# Patient Record
Sex: MALE | Race: BLACK OR AFRICAN AMERICAN | URBAN - METROPOLITAN AREA
[De-identification: names, ages, dates, MRNs, and addresses within clinical notes are randomized per-mention and may not be internally consistent; named-entity substitution may affect disease eponyms.]

---

## 2023-03-22 ENCOUNTER — POST-OPERATIVE VISIT (OUTPATIENT)
Facility: LOCATION | Age: 28
End: 2023-03-22
Payer: COMMERCIAL

## 2023-03-22 DIAGNOSIS — Z48.810 ENCOUNTER FOR SURGICAL AFTERCARE FOLLOWING SURGERY ON A SENSE ORGAN: Primary | ICD-10-CM

## 2023-03-22 PROCEDURE — 99024 POSTOP FOLLOW-UP VISIT: CPT | Performed by: OPTOMETRIST

## 2023-03-22 NOTE — IMPRESSION/PLAN
Impression:  Encounter for surgical aftercare following surgery on a sense organ  Z48.810. 1 week s/p LASIK OU doing well Plan: d/c antibiotic Moxifloxacin and steroid Prednisolone 2-3 days. Continue non-preserved artificial tears q 2 hrs x 1 week. Then switch to bottled tears qid x 2 months then bid x 2 months. Patient to RTC 1 month. Check VA (OD,OS,OU), Ks, prism balance MR, and exam.
RTC PRN decrease VA, increase pain, redness, discharge, photophobia, flashes/floaters or other vision problems.

## 2023-03-28 ENCOUNTER — POST-OPERATIVE VISIT (OUTPATIENT)
Facility: LOCATION | Age: 28
End: 2023-03-28

## 2023-03-28 DIAGNOSIS — Z48.810 ENCOUNTER FOR SURGICAL AFTERCARE FOLLOWING SURGERY ON A SENSE ORGAN: Primary | ICD-10-CM

## 2023-03-28 PROCEDURE — 99024 POSTOP FOLLOW-UP VISIT: CPT | Performed by: OPTOMETRIST

## 2023-03-28 NOTE — IMPRESSION/PLAN
Impression: S/P ID LASIK OU OU - 12 Days. Encounter for surgical aftercare following surgery on a sense organ  Z48.810. 
1 week s/p LASIK OU doing well\
+ [facility] OD>OS Plan: Continue artificial tears qid x 2 months then bid x 2 months. Patient to RTC 1 month. Check VA (OD,OS,OU), Ks, prism balance MR, and exam.
RTC PRN decrease VA, increase pain, redness, discharge, photophobia, flashes/floaters or other vision problems.

## 2023-04-18 ENCOUNTER — POST-OPERATIVE VISIT (OUTPATIENT)
Facility: LOCATION | Age: 28
End: 2023-04-18

## 2023-04-18 DIAGNOSIS — Z48.810 ENCOUNTER FOR SURGICAL AFTERCARE FOLLOWING SURGERY ON A SENSE ORGAN: Primary | ICD-10-CM

## 2023-04-18 PROCEDURE — 99024 POSTOP FOLLOW-UP VISIT: CPT | Performed by: OPTOMETRIST

## 2023-04-18 ASSESSMENT — VISUAL ACUITY
OS: 20/15
OD: 20/15

## 2023-04-18 NOTE — IMPRESSION/PLAN
Impression: S/P ID LASIK OU OU - 33 Days. Encounter for surgical aftercare following surgery on a sense organ  Z48.810. 
s/p LASIK OU doing well Plan: d/c antibiotic Moxifloxacinand steroid Prednisolone 2-3 days. Continue non-preserved artificial tears q 2 hrs x 1 week. Then switch to bottled tears qid x 2 months then bid x 2 months. Pt to see primary eye doctor for annual eye health and vision exams. RTC PRN decrease VA, increase pain, redness, discharge, photophobia, flashes/floaters or other vision problems.